# Patient Record
Sex: FEMALE | Race: WHITE | Employment: FULL TIME | URBAN - METROPOLITAN AREA
[De-identification: names, ages, dates, MRNs, and addresses within clinical notes are randomized per-mention and may not be internally consistent; named-entity substitution may affect disease eponyms.]

---

## 2017-02-25 ENCOUNTER — APPOINTMENT (OUTPATIENT)
Dept: CT IMAGING | Age: 27
End: 2017-02-25
Attending: EMERGENCY MEDICINE
Payer: MEDICAID

## 2017-02-25 ENCOUNTER — HOSPITAL ENCOUNTER (EMERGENCY)
Age: 27
Discharge: HOME | End: 2017-02-25
Attending: EMERGENCY MEDICINE
Payer: MEDICAID

## 2017-02-25 VITALS
SYSTOLIC BLOOD PRESSURE: 128 MMHG | WEIGHT: 190 LBS | TEMPERATURE: 98 F | RESPIRATION RATE: 16 BRPM | HEART RATE: 88 BPM | BODY MASS INDEX: 33.66 KG/M2 | OXYGEN SATURATION: 99 % | DIASTOLIC BLOOD PRESSURE: 88 MMHG | HEIGHT: 63 IN

## 2017-02-25 DIAGNOSIS — S16.1XXA CERVICAL STRAIN, INITIAL ENCOUNTER: Primary | ICD-10-CM
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
## 2017-02-25 DIAGNOSIS — S39.012A BACK STRAIN, INITIAL ENCOUNTER: ICD-10-CM

## 2017-02-25 PROCEDURE — 99284 EMERGENCY DEPT VISIT MOD MDM: CPT | Performed by: EMERGENCY MEDICINE

## 2017-02-25 PROCEDURE — 74011250637 HC RX REV CODE- 250/637: Performed by: EMERGENCY MEDICINE

## 2017-02-25 PROCEDURE — 72125 CT NECK SPINE W/O DYE: CPT

## 2017-02-25 RX ORDER — CYCLOBENZAPRINE HCL 10 MG
10 TABLET ORAL
Qty: 15 TAB | Refills: 0 | Status: SHIPPED | OUTPATIENT
Start: 2017-02-25

## 2017-02-25 RX ORDER — IBUPROFEN 800 MG/1
800 TABLET ORAL
Status: COMPLETED | OUTPATIENT
Start: 2017-02-25 | End: 2017-02-25

## 2017-02-25 RX ADMIN — IBUPROFEN 800 MG: 800 TABLET ORAL at 22:51

## 2017-02-25 NOTE — LETTER
[address] [facility] EMERGENCY DEPT 
[address] [facility] [address] [facility] [address] 
[phone] Work/School Note Date: 2/25/2017 To Whom It May concern: 
 
Vito Farris was seen and treated today in the emergency room by the following provider(s): 
Attending Provider: Jonathan Pimentel MD.   
 
Vito Farris may return to work on Monday, Feb. 27th Sincerely, 
 
 
 
 
Angie George RN
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
